# Patient Record
Sex: MALE | Race: WHITE | NOT HISPANIC OR LATINO | URBAN - METROPOLITAN AREA
[De-identification: names, ages, dates, MRNs, and addresses within clinical notes are randomized per-mention and may not be internally consistent; named-entity substitution may affect disease eponyms.]

---

## 2017-01-30 ENCOUNTER — ALLSCRIPTS OFFICE VISIT (OUTPATIENT)
Dept: OTHER | Facility: OTHER | Age: 22
End: 2017-01-30

## 2017-02-01 ENCOUNTER — GENERIC CONVERSION - ENCOUNTER (OUTPATIENT)
Dept: OTHER | Facility: OTHER | Age: 22
End: 2017-02-01

## 2017-02-01 LAB
CULTURE RESULT (HISTORICAL): NORMAL
MISCELLANEOUS LAB TEST RESULT (HISTORICAL): NORMAL

## 2017-12-13 ENCOUNTER — ALLSCRIPTS OFFICE VISIT (OUTPATIENT)
Dept: OTHER | Facility: OTHER | Age: 22
End: 2017-12-13

## 2018-01-12 VITALS
HEIGHT: 73 IN | BODY MASS INDEX: 18.95 KG/M2 | SYSTOLIC BLOOD PRESSURE: 128 MMHG | DIASTOLIC BLOOD PRESSURE: 68 MMHG | HEART RATE: 82 BPM | TEMPERATURE: 101.7 F | WEIGHT: 143 LBS

## 2018-01-12 NOTE — RESULT NOTES
Verified Results  (1) THROAT CULTURE (CULTURE, UPPER RESPIRATORY) [id number] 12:00AM Lazaro Arreola     Test Name Result Flag Reference   Upper Respiratory Culture Final report     Result 1 Comment     Routine respiratory vanna        Pt aware    Discussion/Summary   please notify pt that his throat culture was negative; I do not see any reason to take the Zpack at this time - would take and finish the Tamiflu as directed

## 2018-01-13 NOTE — PROGRESS NOTES
Assessment    1  Rhinitis (472 0) (J31 0)    Plan  Rhinitis    · Fluticasone Propionate 50 MCG/ACT Nasal Suspension; USE 2 SPRAYS IN EACH  NOSTRIL ONCE DAILY   · Avoid exposure to things that make your problem worse ; Status:Complete;   Done:  39BZA6073 03:54PM   · Drink at least 6 glasses of water or juice a day ; Status:Complete;   Done: 58MTG1470  03:54PM   · How to use saline nose drops:; Status:Complete;   Done: 78VQU3884 03:54PM   · Use a cool mist humidifier in the room ; Status:Complete;   Done: 39KTX6905 03:54PM   · Call [phone] if: The symptoms are not better in 7 days ; Status:Complete;   Done:  54UNN7886 03:54PM   · Call [phone] if: The symptoms seem worse ; Status:Complete;   Done:  72QNN6073 03:54PM   · Call [phone] if: You have pain in your face, cheeks or forehead ; Status:Complete;    Done: 62FAG4756 03:54PM   · Call [phone] if: Your temperature is higher than 101F ; Status:Complete;   Done:  09ZOP2522 03:54PM    Discussion/Summary  Discussion Summary:   Reassured pt that symptoms did not sound bacterial but rather rhinitis related and likely d/t allergies - will start Flonase and OTC antihistamine; advised that nasal steroid can dry out nasal passages and to add a saline nasal spray to his regimen and /or a humidifier; told to call if new/worsening symptoms occur or if not better in 2 wks  Counseling Documentation With Imm: The patient was counseled regarding instructions for management, impressions  Medication SE Review and Pt Understands Tx: Possible side effects of new medications were reviewed with the patient/guardian today  The treatment plan was reviewed with the patient/guardian   The patient/guardian understands and agrees with the treatment plan      Chief Complaint  Chief Complaint Free Text Note Form: NP sick visit      History of Present Illness  HPI: 25 yo WM here with cold symptoms and to establish care - He thinks he saw me in the past - over 3 years ago    Pt here with 1 week of congestion, HA, and nose bleed x 1  He states the nose bleed took about 30 min to stop  He notes he will get nose bleeds about once a month  He notes no cough/ear pain  He notes his throat is dry and is starting to get sore  He notes no ear pain/F/C  He has had some sick contacts  He also has h/o allergies but has not had issues since moving to [place]  He was just back visiting his Mom and was in the same environment where his allergies were bad (3 cats)  He has tried Advil for HA but no other meds  Review of Systems  Complete-Male:   Constitutional: no fever, not feeling poorly and no chills  Eyes: no eyesight problems  ENT: nosebleeds, sore throat and nasal discharge, but as noted in HPI and no earache  Respiratory: no shortness of breath, no cough and no wheezing  Gastrointestinal: no nausea, no vomiting and no diarrhea  Genitourinary: no dysuria  Musculoskeletal: no joint swelling  Integumentary: no rashes  Neurological: headache, but no dizziness  Psychiatric: no depression  Endocrine: no muscle weakness  Hematologic/Lymphatic: no tendency for easy bleeding and no tendency for easy bruising  Past Medical History    1  History of Pectus excavatum (754 81) (Q67 6)    Surgical History    1  History of Repair Of Pectus Excavatum    Family History    1  Family history of diabetes mellitus (V18 0) (Z83 3)   2  Family history of thyroid disease (V18 19) (Z83 49)    3  Family history of hypertension (V17 49) (Z82 49)    Social History    · Full-time employment   · Never smoker   · Non drinker / no alcohol use   · Single    Current Meds   1  No Reported Medications Recorded   2  ZyrTEC Allergy 10 MG Oral Tablet; TAKE 1 TABLET DAILY AS NEEDED; Therapy: 08UCG9264 to (Evaluate:11Hgz2170) Recorded    Allergies    1  Peanut-derived   2   Sulfa Drugs    Vitals  Signs [Data Includes: Current Encounter]   Recorded: 38UOT2804 03:37PM   Temperature: 98 6 F  Heart Rate: 88  Systolic: 124  Diastolic: 72  Height: 6 ft 1 in  Weight: 134 lb   BMI Calculated: 17 68  BSA Calculated: 1 81    Physical Exam    Constitutional   General appearance: No acute distress, well appearing and well nourished  Ears, Nose, Mouth, and Throat   External inspection of ears and nose: Normal     Otoscopic examination: Tympanic membrance translucent with normal light reflex  Canals patent without erythema  post pharyngeal erythema and congestion noted  Pulmonary   Respiratory effort: No increased work of breathing or signs of respiratory distress  Auscultation of lungs: Clear to auscultation, equal breath sounds bilaterally, no wheezes, no rales, no rhonci  Cardiovascular pectus excavatum noted  Auscultation of heart: Normal rate and rhythm, normal S1 and S2, without murmurs  Examination of extremities for edema and/or varicosities: Normal     Abdomen   Abdomen: Non-tender, no masses  Lymphatic   Palpation of lymph nodes in neck: No lymphadenopathy  Musculoskeletal   Gait and station: Normal     Skin   Skin and subcutaneous tissue: Normal without rashes or lesions      Psychiatric   Mood and affect: Normal          Future Appointments    Date/Time Provider Specialty Site   01/26/2016 03:40 PM Vazquez Villalta DO Internal Medicine Francie Tapia MD     Signatures   Electronically signed by : Johnny Kearney DO; Jan 26 2016  3:56PM EST                       (Author)

## 2018-01-17 NOTE — RESULT NOTES
Message    Please call pt and let him know throat culture was negative  No strep  Should stop antibiotic   Patient aware         Verified Results  (1) THROAT CULTURE (CULTURE, UPPER RESPIRATORY) 07VAT6196 03:39PM Leidy Thompson     Test Name Result Flag Reference   CLINICAL REPORT (Report)     Test:        Throat culture  Specimen Type:   Throat  Specimen Date:   11/8/2016 3:39 PM  Result Date:    11/10/2016 10:38 AM  Result Status:   Final result  Resulting Lab:   [place] [address]            Tel: [phone]      CULTURE                                       ------------------                                   Negative for beta-hemolytic Streptococcus

## 2018-01-23 VITALS
HEIGHT: 73 IN | WEIGHT: 153 LBS | DIASTOLIC BLOOD PRESSURE: 80 MMHG | RESPIRATION RATE: 12 BRPM | HEART RATE: 76 BPM | SYSTOLIC BLOOD PRESSURE: 110 MMHG | BODY MASS INDEX: 20.28 KG/M2 | TEMPERATURE: 98.1 F

## 2018-01-23 NOTE — MISCELLANEOUS
Message  Return to work or school:   Genny Gonzalez is under my professional care  He was seen in my office on 12/13/2017  Please excuse for his doctor's visit this morning          Dominguez Brown MD       Signatures   Electronically signed by : Dominguez Brown MD; Dec 13 2017 11:49AM EST                       (Author)

## 2018-01-23 NOTE — PROGRESS NOTES
Assessment    1  Encounter for preventive health examination (V70 0) (Z00 00)   2  Low back pain (724 2) (M54 5)   3  Pectus excavatum (754 81) (Q67 6)   4  Scoliosis (737 30) (M41 9)    Plan  Low back pain, Scoliosis    · *1 - [facility]  *  Status: Active   Requested for: 17Nhg8166  Care Summary provided  : Yes  Pectus excavatum    · 1 - [facility]  (Thoracic Surgery) Co-Management  *  Status: Active   Requested for: 97DTV9212  Care Summary provided  : Yes  Scoliosis    · *1 -  [facility] (ORTHOPEDIC SURGERY )  Co-Management  *  Status: Active  Requested for: 65MYD2366  Care Summary provided  : Yes    Discussion/Summary    Updated immunizations  advised regular exercise  advised dental visits  with pectus excavatum, congenital  He would like further opinion regarding this and to see if any options for him  Will refer to CT surgery for further evaluation  lumbar spine scoliosis  At this point there is likely not much to do for the scoliosis  Advised seeing physical therapy for further evaluation and assistance to prevent low back pain  He is again wanting to see regarding any other options for him with regards to his scoliosis  Referral to Orthopedic spine surgery  Chief Complaint  Patient here for annual physical exam   Candida Hinson on scoliosis  History of Present Illness  HPI: Here for a physical    He is not up to date with dental appointment  No concerns about weight  Regular diet  No regular exercise  He would like to update immunizations today  he has a h/o scoliosis  reports a little over 20 degrees  has been several years since any followup  Has intermittent low back pain due to this  the pain is not constant  Has a h/o pectus excavatum  reports a surgical procedure was tried when he was about 7  This was unsuccessful  Has no difficulty breath or chest pain on a regular basis   He reports occasional chest wall pain depending on what he is doing  Review of Systems    Constitutional: No fever or chills, feels well, no tiredness, no recent weight gain or weight loss  ENT: no complaints of earache, no hearing loss, no nosebleeds, no nasal discharge, no sore throat, no hoarseness  Cardiovascular: No complaints of slow heart rate, no fast heart rate, no chest pain, no palpitations, no leg claudication, no lower extremity  Respiratory: No complaints of shortness of breath, no wheezing, no cough, no SOB on exertion, no orthopnea or PND  Active Problems    1  Vitamin D deficiency (268 9) (E55 9)    Past Medical History    · History of Acute pharyngitis, unspecified etiology (462) (J02 9)   · History of Asperger's disorder (299 80) (F84 5)   · History of Childhood asthma (493 00) (J45 909)   · History of peanut allergy (V15 01) (Z91 010)   · Low back pain (724 2) (M54 5)   · History of Pectus excavatum (754 81) (Q67 6)   · Pectus excavatum (754 81) (Q67 6)   · History of Pectus excavatum (754 81) (Q67 6)   · Scoliosis (737 30) (M41 9)   · History of Scoliosis (737 30) (M41 9)    Surgical History    · History of Repair Of Pectus Excavatum   · History of Repair Of Pectus Excavatum    Family History  Mother    · Family history of diabetes mellitus (V18 0) (Z83 3)   · Family history of thyroid disease (V18 19) (Z83 49)   · Family history of Suicide Attempt   · Family history of Type 2 Diabetes Mellitus  Father    · Family history of hypertension (V17 49) (Z82 49)  Paternal Grandmother    · Family history of Type 2 Diabetes Mellitus    Social History    · Caffeine Use   · Full-time employment   · Never A Smoker   · Never smoker   · Non drinker / no alcohol use   · Single    Current Meds   1  Fluticasone Propionate 50 MCG/ACT Nasal Suspension; USE 2 SPRAYS IN EACH   NOSTRIL ONCE DAILY; Therapy: 26JHX0855 to (Last Rx:26Jan2016)  Requested for: 64LSS8969 Ordered    Allergies    1  Peanut-derived   2  Sulfa Drugs   3  Sulfa Drugs    4  Peanuts   5  Animal dander - Cats    Vitals   Recorded: 71Hrz0423 09:01AM   Temperature 98 1 F   Heart Rate 76   Respiration 12   Systolic 759   Diastolic 80   Height 6 ft 1 in   Weight 153 lb    BMI Calculated 20 19   BSA Calculated 1 92     Physical Exam    Constitutional   General appearance: No acute distress, well appearing and well nourished  Head and Face   Head and face: Normal     Palpation of the face and sinuses: No sinus tenderness  Eyes   Conjunctiva and lids: No erythema, swelling or discharge  Pupils and irises: Equal, round, reactive to light  Ears, Nose, Mouth, and Throat   External inspection of ears and nose: Normal     Otoscopic examination: Tympanic membranes translucent with normal light reflex  Canals patent without erythema  Lips, teeth, and gums: Normal, good dentition  Oropharynx: Normal with no erythema, edema, exudate or lesions  Neck   Neck: Supple, symmetric, trachea midline, no masses  Thyroid: Normal, no thyromegaly  Pulmonary   Respiratory effort: No increased work of breathing or signs of respiratory distress  Palpation of chest: Normal     Auscultation of lungs: Clear to auscultation  Cardiovascular   Palpation of heart: Normal PMI, no thrills  Pedal pulses: 2+ bilaterally  Examination of extremities for edema and/or varicosities: Normal     Chest   Chest: Abnormal   pectus excavatum  Abdomen   Abdomen: Non-tender, no masses  Liver and spleen: No hepatomegaly or splenomegaly  Musculoskeletal scoliosis seen in the lumbar area  Results/Data  PHQ-2 Adult Depression Screening 15Mbh8533 09:03AM User, Anikets     Test Name Result Flag Reference   PHQ-2 Adult Depression Score 6     Over the last two weeks, how often have you been bothered by any of the following problems?   Little interest or pleasure in doing things: Nearly every day - 3  Feeling down, depressed, or hopeless: Nearly every day - 3   PHQ-2 Adult Depression Screening Positive         Message  Return to work or school:   Yuval Suarez is under my professional care  He was seen in my office on 12/13/2017  Please excuse for his doctor's visit this morning          Arabella Cowart MD       Signatures   Electronically signed by : Arabella Cowart MD; Dec 13 2017 11:49AM EST                       (Author)

## 2018-03-28 ENCOUNTER — OFFICE VISIT (OUTPATIENT)
Dept: FAMILY MEDICINE CLINIC | Facility: HOSPITAL | Age: 23
End: 2018-03-28
Payer: COMMERCIAL

## 2018-03-28 VITALS
WEIGHT: 160 LBS | DIASTOLIC BLOOD PRESSURE: 62 MMHG | SYSTOLIC BLOOD PRESSURE: 116 MMHG | BODY MASS INDEX: 21.2 KG/M2 | TEMPERATURE: 98.4 F | HEART RATE: 74 BPM | HEIGHT: 73 IN

## 2018-03-28 DIAGNOSIS — B35.4 TINEA CORPORIS: Primary | ICD-10-CM

## 2018-03-28 PROBLEM — Q67.6 PECTUS EXCAVATUM: Status: ACTIVE | Noted: 2017-12-13

## 2018-03-28 PROBLEM — M41.9 SCOLIOSIS: Status: ACTIVE | Noted: 2017-12-13

## 2018-03-28 PROCEDURE — 3008F BODY MASS INDEX DOCD: CPT | Performed by: NURSE PRACTITIONER

## 2018-03-28 PROCEDURE — 99213 OFFICE O/P EST LOW 20 MIN: CPT | Performed by: NURSE PRACTITIONER

## 2018-03-28 RX ORDER — KETOCONAZOLE 20 MG/G
CREAM TOPICAL DAILY
Qty: 15 G | Refills: 0 | Status: SHIPPED | OUTPATIENT
Start: 2018-03-28 | End: 2019-06-18

## 2018-03-28 RX ORDER — FLUTICASONE PROPIONATE 50 MCG
2 SPRAY, SUSPENSION (ML) NASAL DAILY
COMMUNITY
Start: 2016-01-26 | End: 2019-06-18

## 2018-03-28 NOTE — PROGRESS NOTES
Assessment/Plan:     Appears as tinea  Will treat with prescription strength antifungal    Advised washing linens and towels in hot water and change frequently  Call if no improvement in 2 weeks  Diagnoses and all orders for this visit:    Tinea corporis  -     ketoconazole (NIZORAL) 2 % cream; Apply topically daily          Subjective:     Patient ID: Haily Nguyen is a 26 y o  male  Has spot on neck for the past few months  Thinks it is ringworm  Has been using OTC Lotrimin for 2 weeks w/o getting better  Has not chaged in last few months  Denies any other rashes  Itchy  Not painful  Denies fever,chills  Review of Systems   Constitutional: Negative for chills and fever  Skin: Positive for rash  The following portions of the patient's history were reviewed and updated as appropriate: allergies, current medications, past family history, past medical history, past social history, past surgical history and problem list     Objective:  Vitals:    03/28/18 0955   BP: 116/62   Pulse: 74   Temp: 98 4 °F (36 9 °C)      Physical Exam   Constitutional: He is oriented to person, place, and time  He appears well-developed and well-nourished  Cardiovascular: Normal rate, regular rhythm and normal heart sounds  Pulmonary/Chest: Effort normal and breath sounds normal    Neurological: He is alert and oriented to person, place, and time  Skin: Skin is warm and dry  Rash noted  Psychiatric: He has a normal mood and affect

## 2019-06-18 ENCOUNTER — OFFICE VISIT (OUTPATIENT)
Dept: FAMILY MEDICINE CLINIC | Facility: HOSPITAL | Age: 24
End: 2019-06-18
Payer: COMMERCIAL

## 2019-06-18 VITALS
SYSTOLIC BLOOD PRESSURE: 132 MMHG | WEIGHT: 166 LBS | DIASTOLIC BLOOD PRESSURE: 78 MMHG | TEMPERATURE: 98.1 F | HEIGHT: 73 IN | HEART RATE: 94 BPM | BODY MASS INDEX: 22 KG/M2

## 2019-06-18 DIAGNOSIS — Z00.00 ANNUAL PHYSICAL EXAM: Primary | ICD-10-CM

## 2019-06-18 PROCEDURE — 99395 PREV VISIT EST AGE 18-39: CPT | Performed by: INTERNAL MEDICINE

## 2021-12-04 ENCOUNTER — IMMUNIZATIONS (OUTPATIENT)
Dept: FAMILY MEDICINE CLINIC | Facility: HOSPITAL | Age: 26
End: 2021-12-04

## 2021-12-04 DIAGNOSIS — Z23 ENCOUNTER FOR IMMUNIZATION: Primary | ICD-10-CM

## 2021-12-04 PROCEDURE — 0001A COVID-19 PFIZER VACC 0.3 ML: CPT

## 2021-12-04 PROCEDURE — 91300 COVID-19 PFIZER VACC 0.3 ML: CPT
